# Patient Record
Sex: FEMALE | Race: OTHER | HISPANIC OR LATINO | URBAN - METROPOLITAN AREA
[De-identification: names, ages, dates, MRNs, and addresses within clinical notes are randomized per-mention and may not be internally consistent; named-entity substitution may affect disease eponyms.]

---

## 2017-03-27 ENCOUNTER — INPATIENT (INPATIENT)
Facility: HOSPITAL | Age: 27
LOS: 0 days | Discharge: HOME | DRG: 392 | End: 2017-03-28
Attending: OBSTETRICS & GYNECOLOGY | Admitting: OBSTETRICS & GYNECOLOGY
Payer: COMMERCIAL

## 2017-03-27 VITALS
TEMPERATURE: 98 F | HEART RATE: 102 BPM | RESPIRATION RATE: 18 BRPM | WEIGHT: 141.1 LBS | DIASTOLIC BLOOD PRESSURE: 80 MMHG | SYSTOLIC BLOOD PRESSURE: 135 MMHG | OXYGEN SATURATION: 99 %

## 2017-03-27 DIAGNOSIS — O47.02 FALSE LABOR BEFORE 37 COMPLETED WEEKS OF GESTATION, SECOND TRIMESTER: ICD-10-CM

## 2017-03-27 LAB
ALBUMIN SERPL ELPH-MCNC: 3.5 G/DL — SIGNIFICANT CHANGE UP (ref 3.3–5.2)
ALP SERPL-CCNC: 104 U/L — SIGNIFICANT CHANGE UP (ref 40–120)
ALT FLD-CCNC: 17 U/L — SIGNIFICANT CHANGE UP
ANION GAP SERPL CALC-SCNC: 16 MMOL/L — SIGNIFICANT CHANGE UP (ref 5–17)
ANISOCYTOSIS BLD QL: SLIGHT — SIGNIFICANT CHANGE UP
APPEARANCE UR: CLEAR — SIGNIFICANT CHANGE UP
AST SERPL-CCNC: 24 U/L — SIGNIFICANT CHANGE UP
BACTERIA # UR AUTO: ABNORMAL
BILIRUB SERPL-MCNC: 0.2 MG/DL — LOW (ref 0.4–2)
BILIRUB UR-MCNC: ABNORMAL
BUN SERPL-MCNC: 10 MG/DL — SIGNIFICANT CHANGE UP (ref 8–20)
CALCIUM SERPL-MCNC: 8.9 MG/DL — SIGNIFICANT CHANGE UP (ref 8.6–10.2)
CHLORIDE SERPL-SCNC: 99 MMOL/L — SIGNIFICANT CHANGE UP (ref 98–107)
CO2 SERPL-SCNC: 20 MMOL/L — LOW (ref 22–29)
COLOR SPEC: YELLOW — SIGNIFICANT CHANGE UP
CREAT SERPL-MCNC: 0.44 MG/DL — LOW (ref 0.5–1.3)
DACRYOCYTES BLD QL SMEAR: SLIGHT — SIGNIFICANT CHANGE UP
DIFF PNL FLD: ABNORMAL
ELLIPTOCYTES BLD QL SMEAR: SLIGHT — SIGNIFICANT CHANGE UP
EPI CELLS # UR: SIGNIFICANT CHANGE UP
GLUCOSE SERPL-MCNC: 81 MG/DL — SIGNIFICANT CHANGE UP (ref 70–115)
GLUCOSE UR QL: NEGATIVE MG/DL — SIGNIFICANT CHANGE UP
HCT VFR BLD CALC: 31.6 % — LOW (ref 37–47)
HGB BLD-MCNC: 11.1 G/DL — LOW (ref 12–16)
KETONES UR-MCNC: ABNORMAL
LEUKOCYTE ESTERASE UR-ACNC: ABNORMAL
LYMPHOCYTES # BLD AUTO: 11 % — LOW (ref 20–55)
MACROCYTES BLD QL: SLIGHT — SIGNIFICANT CHANGE UP
MAGNESIUM SERPL-MCNC: 2 MG/DL — SIGNIFICANT CHANGE UP (ref 1.8–2.5)
MCHC RBC-ENTMCNC: 28.8 PG — SIGNIFICANT CHANGE UP (ref 27–31)
MCHC RBC-ENTMCNC: 35.1 G/DL — SIGNIFICANT CHANGE UP (ref 32–36)
MCV RBC AUTO: 81.9 FL — SIGNIFICANT CHANGE UP (ref 81–99)
MICROCYTES BLD QL: SLIGHT — SIGNIFICANT CHANGE UP
MONOCYTES NFR BLD AUTO: 8 % — SIGNIFICANT CHANGE UP (ref 3–10)
NEUTROPHILS NFR BLD AUTO: 80 % — HIGH (ref 37–73)
NITRITE UR-MCNC: NEGATIVE — SIGNIFICANT CHANGE UP
OVALOCYTES BLD QL SMEAR: SLIGHT — SIGNIFICANT CHANGE UP
PH UR: 6.5 — SIGNIFICANT CHANGE UP (ref 4.8–8)
PHOSPHATE SERPL-MCNC: 1.9 MG/DL — LOW (ref 2.4–4.7)
PLAT MORPH BLD: NORMAL — SIGNIFICANT CHANGE UP
PLATELET # BLD AUTO: 226 K/UL — SIGNIFICANT CHANGE UP (ref 150–400)
POIKILOCYTOSIS BLD QL AUTO: SLIGHT — SIGNIFICANT CHANGE UP
POLYCHROMASIA BLD QL SMEAR: SLIGHT — SIGNIFICANT CHANGE UP
POTASSIUM SERPL-MCNC: 3.1 MMOL/L — LOW (ref 3.5–5.3)
POTASSIUM SERPL-SCNC: 3.1 MMOL/L — LOW (ref 3.5–5.3)
PROT SERPL-MCNC: 7.2 G/DL — SIGNIFICANT CHANGE UP (ref 6.6–8.7)
PROT UR-MCNC: 15 MG/DL
RBC # BLD: 3.86 M/UL — LOW (ref 4.4–5.2)
RBC # FLD: 14.3 % — SIGNIFICANT CHANGE UP (ref 11–15.6)
RBC BLD AUTO: ABNORMAL
RBC CASTS # UR COMP ASSIST: ABNORMAL /HPF (ref 0–4)
SODIUM SERPL-SCNC: 135 MMOL/L — SIGNIFICANT CHANGE UP (ref 135–145)
SP GR SPEC: 1.01 — SIGNIFICANT CHANGE UP (ref 1.01–1.02)
UROBILINOGEN FLD QL: NEGATIVE MG/DL — SIGNIFICANT CHANGE UP
VARIANT LYMPHS # BLD: 1 % — SIGNIFICANT CHANGE UP (ref 0–6)
WBC # BLD: 4 K/UL — LOW (ref 4.8–10.8)
WBC # FLD AUTO: 4 K/UL — LOW (ref 4.8–10.8)
WBC UR QL: ABNORMAL

## 2017-03-27 RX ORDER — SODIUM CHLORIDE 9 MG/ML
1000 INJECTION, SOLUTION INTRAVENOUS ONCE
Qty: 0 | Refills: 0 | Status: COMPLETED | OUTPATIENT
Start: 2017-03-27 | End: 2017-03-27

## 2017-03-27 RX ORDER — SODIUM CHLORIDE 9 MG/ML
1000 INJECTION, SOLUTION INTRAVENOUS
Qty: 0 | Refills: 0 | Status: DISCONTINUED | OUTPATIENT
Start: 2017-03-27 | End: 2017-03-27

## 2017-03-27 RX ORDER — POTASSIUM PHOSPHATE, MONOBASIC POTASSIUM PHOSPHATE, DIBASIC 236; 224 MG/ML; MG/ML
20 INJECTION, SOLUTION INTRAVENOUS ONCE
Qty: 0 | Refills: 0 | Status: DISCONTINUED | OUTPATIENT
Start: 2017-03-27 | End: 2017-03-27

## 2017-03-27 RX ORDER — POTASSIUM CHLORIDE 20 MEQ
10 PACKET (EA) ORAL
Qty: 0 | Refills: 0 | Status: DISCONTINUED | OUTPATIENT
Start: 2017-03-27 | End: 2017-03-27

## 2017-03-27 RX ORDER — SODIUM,POTASSIUM PHOSPHATES 278-250MG
1 POWDER IN PACKET (EA) ORAL
Qty: 0 | Refills: 0 | Status: DISCONTINUED | OUTPATIENT
Start: 2017-03-27 | End: 2017-03-28

## 2017-03-27 RX ORDER — SODIUM,POTASSIUM PHOSPHATES 278-250MG
1 POWDER IN PACKET (EA) ORAL
Qty: 0 | Refills: 0 | Status: DISCONTINUED | OUTPATIENT
Start: 2017-03-27 | End: 2017-03-27

## 2017-03-27 RX ORDER — SODIUM CHLORIDE 9 MG/ML
1000 INJECTION, SOLUTION INTRAVENOUS
Qty: 0 | Refills: 0 | Status: DISCONTINUED | OUTPATIENT
Start: 2017-03-27 | End: 2017-03-28

## 2017-03-27 RX ORDER — POTASSIUM PHOSPHATE, MONOBASIC POTASSIUM PHOSPHATE, DIBASIC 236; 224 MG/ML; MG/ML
15 INJECTION, SOLUTION INTRAVENOUS ONCE
Qty: 0 | Refills: 0 | Status: DISCONTINUED | OUTPATIENT
Start: 2017-03-27 | End: 2017-03-27

## 2017-03-27 RX ORDER — ONDANSETRON 8 MG/1
4 TABLET, FILM COATED ORAL EVERY 6 HOURS
Qty: 0 | Refills: 0 | Status: DISCONTINUED | OUTPATIENT
Start: 2017-03-27 | End: 2017-03-28

## 2017-03-27 RX ORDER — POTASSIUM CHLORIDE 20 MEQ
20 PACKET (EA) ORAL
Qty: 0 | Refills: 0 | Status: DISCONTINUED | OUTPATIENT
Start: 2017-03-27 | End: 2017-03-27

## 2017-03-27 RX ADMIN — SODIUM CHLORIDE 1000 MILLILITER(S): 9 INJECTION, SOLUTION INTRAVENOUS at 16:52

## 2017-03-27 RX ADMIN — SODIUM CHLORIDE 125 MILLILITER(S): 9 INJECTION, SOLUTION INTRAVENOUS at 21:41

## 2017-03-27 RX ADMIN — Medication 100 MILLIEQUIVALENT(S): at 19:19

## 2017-03-27 RX ADMIN — SODIUM CHLORIDE 125 MILLILITER(S): 9 INJECTION, SOLUTION INTRAVENOUS at 17:53

## 2017-03-27 RX ADMIN — Medication 1 TABLET(S): at 21:43

## 2017-03-28 VITALS
RESPIRATION RATE: 189 BRPM | DIASTOLIC BLOOD PRESSURE: 56 MMHG | TEMPERATURE: 98 F | HEART RATE: 97 BPM | SYSTOLIC BLOOD PRESSURE: 90 MMHG

## 2017-03-28 DIAGNOSIS — K52.9 NONINFECTIVE GASTROENTERITIS AND COLITIS, UNSPECIFIED: ICD-10-CM

## 2017-03-28 LAB
ALBUMIN SERPL ELPH-MCNC: 3 G/DL — LOW (ref 3.3–5.2)
ALP SERPL-CCNC: 82 U/L — SIGNIFICANT CHANGE UP (ref 40–120)
ALT FLD-CCNC: 13 U/L — SIGNIFICANT CHANGE UP
ANION GAP SERPL CALC-SCNC: 12 MMOL/L — SIGNIFICANT CHANGE UP (ref 5–17)
AST SERPL-CCNC: 20 U/L — SIGNIFICANT CHANGE UP
BILIRUB SERPL-MCNC: 0.2 MG/DL — LOW (ref 0.4–2)
BUN SERPL-MCNC: 6 MG/DL — LOW (ref 8–20)
CALCIUM SERPL-MCNC: 7.8 MG/DL — LOW (ref 8.6–10.2)
CHLORIDE SERPL-SCNC: 104 MMOL/L — SIGNIFICANT CHANGE UP (ref 98–107)
CO2 SERPL-SCNC: 22 MMOL/L — SIGNIFICANT CHANGE UP (ref 22–29)
CREAT SERPL-MCNC: 0.35 MG/DL — LOW (ref 0.5–1.3)
GLUCOSE SERPL-MCNC: 70 MG/DL — SIGNIFICANT CHANGE UP (ref 70–115)
POTASSIUM SERPL-MCNC: 3.3 MMOL/L — LOW (ref 3.5–5.3)
POTASSIUM SERPL-SCNC: 3.3 MMOL/L — LOW (ref 3.5–5.3)
PROT SERPL-MCNC: 5.7 G/DL — LOW (ref 6.6–8.7)
SODIUM SERPL-SCNC: 138 MMOL/L — SIGNIFICANT CHANGE UP (ref 135–145)

## 2017-03-28 RX ORDER — ONDANSETRON 8 MG/1
1 TABLET, FILM COATED ORAL
Qty: 28 | Refills: 0 | OUTPATIENT
Start: 2017-03-28 | End: 2017-04-04

## 2017-03-28 RX ADMIN — SODIUM CHLORIDE 125 MILLILITER(S): 9 INJECTION, SOLUTION INTRAVENOUS at 06:19

## 2017-03-28 RX ADMIN — Medication 1 TABLET(S): at 13:38

## 2017-03-28 RX ADMIN — Medication 1 TABLET(S): at 11:21

## 2017-03-28 NOTE — PROGRESS NOTE ADULT - SUBJECTIVE AND OBJECTIVE BOX
26y year old  s/p  at 25.5wk gestation here for cc viral gastroenteritis.   Patient seen and examined at bedside, no acute overnight events.   Patient is ambulating, +eating, +PO hydration, +voiding, +Flatus.  Denies any abdominal pain. afebrile overnight;   Denies headache, SOB, fever, chills and calf pain.    VS:   Vital Signs Last 24 Hrs  T(C): 36.8, Max: 36.8 ( @ 01:57)  T(F): 98.2, Max: 98.2 ( @ 01:57)  afebrile overnight  HR: 104 (102 - 104)   tachycardic  BP: 121/80 (121/80 - 135/80)  BP(mean): --  RR: 19 (18 - 19)  SpO2: 98% (98% - 99%)    Physical Exam:  General: NAD  CVS: RRR, +S1/S2  Lungs: CTAB, no wheeze, ronchi or rales.   Abdomen: +BS, soft, ND, minimally tender,  Ext: No cyanosis, edema or calf tenderness.     Labs:                        11.1   4.0   )-----------( 226      ( 27 Mar 2017 17:23 )             31.6     Urinalysis Basic - ( 27 Mar 2017 17:21 )    Color: Yellow / Appearance: Clear / S.010 / pH: x  Gluc: x / Ketone: Moderate  / Bili: Small / Urobili: Negative mg/dL   Blood: x / Protein: 15 mg/dL / Nitrite: Negative   Leuk Esterase: Small / RBC: 3-5 /HPF / WBC 6-10   Sq Epi: x / Non Sq Epi: Few / Bacteria: Few        Medication:  MEDICATIONS  (STANDING):  lactated ringers 1000milliLiter(s) IV Continuous <Continuous>  potassium acid phosphate/sodium acid phosphate tablet (K-PHOS No. 2) 1Tablet(s) Oral four times a day with meals    MEDICATIONS  (PRN):  ondansetron    Tablet 4milliGRAM(s) Oral every 6 hours PRN Nausea and/or Vomiting 26y year old  s/p  at 25.5wk gestation here for cc viral gastroenteritis.   Patient seen and examined at bedside, no acute overnight events.   Patient is ambulating, +PO hydration, +voiding, +Flatus.  Denies any abdominal pain. afebrile, no bm, no vomiting overnight;   Patient tolerated yogurt and jello last night;    Denies vomiting,headache, SOB, fever, chills and calf pain.  Last BM was 7am yesterday;     VS:   Vital Signs Last 24 Hrs  T(C): 36.8, Max: 36.8 ( @ 01:57)  T(F): 98.2, Max: 98.2 ( @ 01:57)  afebrile overnight  HR: 104 (102 - 104)   tachycardic  BP: 121/80 (121/80 - 135/80)  BP(mean): --  RR: 19 (18 - 19)  SpO2: 98% (98% - 99%)    Physical Exam:  General: NAD  CVS: RRR, +S1/S2  Lungs: CTAB, no wheeze, ronchi or rales.   Abdomen: +BS, soft, ND, minimally tender,  Ext: No cyanosis, edema or calf tenderness.     Labs:                        11.1   4.0   )-----------( 226      ( 27 Mar 2017 17:23 )             31.6     Urinalysis Basic - ( 27 Mar 2017 17:21 )    Color: Yellow / Appearance: Clear / S.010 / pH: x  Gluc: x / Ketone: Moderate  / Bili: Small / Urobili: Negative mg/dL   Blood: x / Protein: 15 mg/dL / Nitrite: Negative   Leuk Esterase: Small / RBC: 3-5 /HPF / WBC 6-10   Sq Epi: x / Non Sq Epi: Few / Bacteria: Few        Medication:  MEDICATIONS  (STANDING):  lactated ringers 1000milliLiter(s) IV Continuous <Continuous>  potassium acid phosphate/sodium acid phosphate tablet (K-PHOS No. 2) 1Tablet(s) Oral four times a day with meals    MEDICATIONS  (PRN):  ondansetron    Tablet 4milliGRAM(s) Oral every 6 hours PRN Nausea and/or Vomiting 26y year old  s/p  at 25.5wk gestation here for cc viral gastroenteritis.   Patient seen and examined at bedside, no acute overnight events.   Patient is ambulating, +PO hydration, +voiding, +Flatus.  Denies any abdominal pain. afebrile, no bm, no vomiting overnight;   Patient tolerated yogurt and jello last night;    Denies vomiting, headache, SOB, fever, chills and calf pain.  Last BM was 7am yesterday;     PMH- neg    PSH- neg;    Allergies- Penicillin (hives)    VS:   Vital Signs Last 24 Hrs  T(C): 36.8, Max: 36.8 ( @ 01:57)  T(F): 98.2, Max: 98.2 ( @ 01:57)  afebrile overnight  HR: 104 (102 - 104)   tachycardic  BP: 121/80 (121/80 - 135/80)  BP(mean): --  RR: 19 (18 - 19)  SpO2: 98% (98% - 99%)    Physical Exam:  General: NAD  CVS: RRR, +S1/S2  Lungs: CTAB, no wheeze, ronchi or rales.   Abdomen: +BS, soft, ND, minimally tender,  Ext: No cyanosis, edema or calf tenderness.     Labs:                        11.1   4.0   )-----------( 226      ( 27 Mar 2017 17:23 )             31.6     Urinalysis Basic - ( 27 Mar 2017 17:21 )    Color: Yellow / Appearance: Clear / S.010 / pH: x  Gluc: x / Ketone: Moderate  / Bili: Small / Urobili: Negative mg/dL   Blood: x / Protein: 15 mg/dL / Nitrite: Negative   Leuk Esterase: Small / RBC: 3-5 /HPF / WBC 6-10   Sq Epi: x / Non Sq Epi: Few / Bacteria: Few        Medication:  MEDICATIONS  (STANDING):  lactated ringers 1000milliLiter(s) IV Continuous <Continuous>  potassium acid phosphate/sodium acid phosphate tablet (K-PHOS No. 2) 1Tablet(s) Oral four times a day with meals    MEDICATIONS  (PRN):  ondansetron    Tablet 4milliGRAM(s) Oral every 6 hours PRN Nausea and/or Vomiting

## 2017-03-28 NOTE — DISCHARGE NOTE ANTEPARTUM - HOSPITAL COURSE
26y year old  s/p  at 25.5wk gestation here for cc viral [id number] year old  s/p  at 25.5wk gestation here for cc viral gastroenteritis.   patient stable, afebrile;   s/p potassium repletion;  pending f/u labs this am;  diet will be advanced to low residue diet (no fatty foods). Pt is 26y year old  s/p  at 25.5wk gestation here 2with diarrhea and vomiting for 3 days. Pt is diagnosed with viral gastroenteritis and kept for observation. Pt was found to have electrolyte imbalance and electrolytes were replaced. Pt given IV fluids. Patient is stable, afebrile. Diet is advanced to low residue diet (no fatty foods). Pt had only one episode of diarrhea while in hospital. Pt feels better. Pt was also started on zofran for vomiting. Pt is medically optimized for discharge to f/u at [facility].

## 2017-03-28 NOTE — PROGRESS NOTE ADULT - PROBLEM SELECTOR PLAN 1
viral gastroenteritis; patient stable, afebrile;   s/p potassium repletion;  pending f/u labs this am; viral gastroenteritis; patient stable, afebrile;   s/p potassium repletion;  pending f/u labs this am;  diet will be advanced to low residue diet (no fatty foods)

## 2017-03-28 NOTE — DISCHARGE NOTE ANTEPARTUM - CARE PLAN
Principal Discharge DX:	Gastroenteritis  Goal:	resolving;  Instructions for follow-up, activity and diet:	followup with obgyn within the next week;  no strenuous activity;  bland diet for the next week; Principal Discharge DX:	Gastroenteritis  Goal:	resolving;  Instructions for follow-up, activity and diet:	No strenuous activity recommended. Diet and activity as tolerated. Small food portions advised at a time. Pt advised to followup with ob/gyn within the next week.

## 2017-03-28 NOTE — DISCHARGE NOTE ANTEPARTUM - CARE PROVIDER_API CALL
Willi Orellana (MD), Obstetrics and Gynecology  [address] [place] [address]  Phone: [phone]  Fax: [phone]

## 2017-03-28 NOTE — DISCHARGE NOTE ANTEPARTUM - PATIENT PORTAL LINK FT
“You can access the FollowHealth Patient Portal, offered by [facility], by registering with the following website: [email or web address]”

## 2017-03-28 NOTE — DISCHARGE NOTE ANTEPARTUM - PLAN OF CARE
resolving; followup with obgyn within the next week;  no strenuous activity;  bland diet for the next week; No strenuous activity recommended. Diet and activity as tolerated. Small food portions advised at a time. Pt advised to followup with ob/gyn within the next week.

## 2017-03-28 NOTE — DISCHARGE NOTE ANTEPARTUM - MEDICATION SUMMARY - MEDICATIONS TO TAKE
I will START or STAY ON the medications listed below when I get home from the hospital:    prenatal vitamins  --  by mouth once a day  -- Indication: For vitamin    ondansetron 4 mg oral tablet  -- 1 tab(s) by mouth every 6 hours, As needed, Nausea and/or Vomiting  -- Indication: For vomiting

## 2017-07-02 ENCOUNTER — OUTPATIENT (OUTPATIENT)
Dept: OUTPATIENT SERVICES | Facility: HOSPITAL | Age: 27
LOS: 1 days | End: 2017-07-02
Payer: COMMERCIAL

## 2017-07-02 DIAGNOSIS — O47.1 FALSE LABOR AT OR AFTER 37 COMPLETED WEEKS OF GESTATION: ICD-10-CM

## 2017-07-02 DIAGNOSIS — O35.9XX0 MATERNAL CARE FOR (SUSPECTED) FETAL ABNORMALITY AND DAMAGE, UNSPECIFIED, NOT APPLICABLE OR UNSPECIFIED: ICD-10-CM

## 2017-07-02 PROCEDURE — G0463: CPT

## 2017-07-02 PROCEDURE — 59025 FETAL NON-STRESS TEST: CPT

## 2017-07-02 PROCEDURE — 76815 OB US LIMITED FETUS(S): CPT

## 2017-07-03 ENCOUNTER — INPATIENT (INPATIENT)
Facility: HOSPITAL | Age: 27
LOS: 1 days | Discharge: HOME | End: 2017-07-05
Attending: OBSTETRICS & GYNECOLOGY | Admitting: OBSTETRICS & GYNECOLOGY
Payer: COMMERCIAL

## 2017-07-03 VITALS — WEIGHT: 147.71 LBS | HEIGHT: 60 IN

## 2017-07-03 DIAGNOSIS — O26.893 OTHER SPECIFIED PREGNANCY RELATED CONDITIONS, THIRD TRIMESTER: ICD-10-CM

## 2017-07-03 DIAGNOSIS — O47.1 FALSE LABOR AT OR AFTER 37 COMPLETED WEEKS OF GESTATION: ICD-10-CM

## 2017-07-03 LAB
APPEARANCE UR: CLEAR — SIGNIFICANT CHANGE UP
BACTERIA # UR AUTO: ABNORMAL
BASE EXCESS BLDCOA CALC-SCNC: -7.4 MMOL/L — SIGNIFICANT CHANGE UP (ref -11.6–0.4)
BASOPHILS # BLD AUTO: 0 K/UL — SIGNIFICANT CHANGE UP (ref 0–0.2)
BASOPHILS NFR BLD AUTO: 0.1 % — SIGNIFICANT CHANGE UP (ref 0–2)
BILIRUB UR-MCNC: ABNORMAL
BLD GP AB SCN SERPL QL: SIGNIFICANT CHANGE UP
COLOR SPEC: YELLOW — SIGNIFICANT CHANGE UP
DIFF PNL FLD: ABNORMAL
EPI CELLS # UR: SIGNIFICANT CHANGE UP
GAS PNL BLDCOV: 7.36 — SIGNIFICANT CHANGE UP (ref 7.11–7.36)
GLUCOSE UR QL: NEGATIVE MG/DL — SIGNIFICANT CHANGE UP
HBV SURFACE AG SERPL QL IA: SIGNIFICANT CHANGE UP
HCO3 BLDCOA-SCNC: 17 MMOL/L — SIGNIFICANT CHANGE UP (ref 15–27)
HCT VFR BLD CALC: 32.5 % — LOW (ref 37–47)
HGB BLD-MCNC: 10.7 G/DL — LOW (ref 12–16)
KETONES UR-MCNC: ABNORMAL
LEUKOCYTE ESTERASE UR-ACNC: ABNORMAL
LYMPHOCYTES # BLD AUTO: 0.8 K/UL — LOW (ref 1–4.8)
LYMPHOCYTES # BLD AUTO: 12.6 % — LOW (ref 20–55)
MCHC RBC-ENTMCNC: 25.1 PG — LOW (ref 27–31)
MCHC RBC-ENTMCNC: 32.9 G/DL — SIGNIFICANT CHANGE UP (ref 32–36)
MCV RBC AUTO: 76.3 FL — LOW (ref 81–99)
MONOCYTES # BLD AUTO: 0.3 K/UL — SIGNIFICANT CHANGE UP (ref 0–0.8)
MONOCYTES NFR BLD AUTO: 4.4 % — SIGNIFICANT CHANGE UP (ref 3–10)
NEUTROPHILS # BLD AUTO: 5.6 K/UL — SIGNIFICANT CHANGE UP (ref 1.8–8)
NEUTROPHILS NFR BLD AUTO: 82.3 % — HIGH (ref 37–73)
NITRITE UR-MCNC: NEGATIVE — SIGNIFICANT CHANGE UP
PCO2 BLDCOA: 34.7 MMHG — SIGNIFICANT CHANGE UP (ref 32.2–65.8)
PCO2 BLDCOV: 35.8 MMHG — SIGNIFICANT CHANGE UP (ref 27–49.4)
PH BLDCOA: 7.32 — SIGNIFICANT CHANGE UP (ref 7.11–7.36)
PH UR: 6 — SIGNIFICANT CHANGE UP (ref 5–8)
PLATELET # BLD AUTO: 188 K/UL — SIGNIFICANT CHANGE UP (ref 150–400)
PO2 BLDCOA: 36 MMHG — HIGH (ref 6–30)
PO2 BLDCOA: 36.6 MMHG — SIGNIFICANT CHANGE UP (ref 17.4–41)
PROT UR-MCNC: 30 MG/DL
RBC # BLD: 4.26 M/UL — LOW (ref 4.4–5.2)
RBC # FLD: 16 % — HIGH (ref 11–15.6)
RBC CASTS # UR COMP ASSIST: ABNORMAL /HPF (ref 0–4)
RPR SERPL-ACNC: SIGNIFICANT CHANGE UP
RUBV IGG SER-ACNC: POSITIVE — SIGNIFICANT CHANGE UP
RUBV IGG SER-IMP: SIGNIFICANT CHANGE UP
SAO2 % BLDCOA: SIGNIFICANT CHANGE UP
SAO2 % BLDCOV: SIGNIFICANT CHANGE UP
SP GR SPEC: 1.02 — SIGNIFICANT CHANGE UP (ref 1.01–1.02)
TYPE + AB SCN PNL BLD: SIGNIFICANT CHANGE UP
UROBILINOGEN FLD QL: NEGATIVE MG/DL — SIGNIFICANT CHANGE UP
WBC # BLD: 6.8 K/UL — SIGNIFICANT CHANGE UP (ref 4.8–10.8)
WBC # FLD AUTO: 6.8 K/UL — SIGNIFICANT CHANGE UP (ref 4.8–10.8)
WBC UR QL: ABNORMAL

## 2017-07-03 PROCEDURE — 93010 ELECTROCARDIOGRAM REPORT: CPT

## 2017-07-03 RX ORDER — DIBUCAINE 1 %
1 OINTMENT (GRAM) RECTAL EVERY 4 HOURS
Qty: 0 | Refills: 0 | Status: DISCONTINUED | OUTPATIENT
Start: 2017-07-03 | End: 2017-07-05

## 2017-07-03 RX ORDER — OXYTOCIN 10 UNIT/ML
333.33 VIAL (ML) INJECTION
Qty: 20 | Refills: 0 | Status: DISCONTINUED | OUTPATIENT
Start: 2017-07-03 | End: 2017-07-03

## 2017-07-03 RX ORDER — SODIUM CHLORIDE 9 MG/ML
1000 INJECTION, SOLUTION INTRAVENOUS
Qty: 0 | Refills: 0 | Status: DISCONTINUED | OUTPATIENT
Start: 2017-07-03 | End: 2017-07-03

## 2017-07-03 RX ORDER — TETANUS TOXOID, REDUCED DIPHTHERIA TOXOID AND ACELLULAR PERTUSSIS VACCINE, ADSORBED 5; 2.5; 8; 8; 2.5 [IU]/.5ML; [IU]/.5ML; UG/.5ML; UG/.5ML; UG/.5ML
0.5 SUSPENSION INTRAMUSCULAR ONCE
Qty: 0 | Refills: 0 | Status: DISCONTINUED | OUTPATIENT
Start: 2017-07-03 | End: 2017-07-05

## 2017-07-03 RX ORDER — MAGNESIUM HYDROXIDE 400 MG/1
30 TABLET, CHEWABLE ORAL
Qty: 0 | Refills: 0 | Status: DISCONTINUED | OUTPATIENT
Start: 2017-07-03 | End: 2017-07-05

## 2017-07-03 RX ORDER — OXYTOCIN 10 UNIT/ML
333.33 VIAL (ML) INJECTION
Qty: 20 | Refills: 0 | Status: COMPLETED | OUTPATIENT
Start: 2017-07-03

## 2017-07-03 RX ORDER — IBUPROFEN 200 MG
600 TABLET ORAL EVERY 6 HOURS
Qty: 0 | Refills: 0 | Status: DISCONTINUED | OUTPATIENT
Start: 2017-07-03 | End: 2017-07-05

## 2017-07-03 RX ORDER — GLYCERIN ADULT
1 SUPPOSITORY, RECTAL RECTAL AT BEDTIME
Qty: 0 | Refills: 0 | Status: DISCONTINUED | OUTPATIENT
Start: 2017-07-03 | End: 2017-07-05

## 2017-07-03 RX ORDER — FERROUS SULFATE 325(65) MG
325 TABLET ORAL DAILY
Qty: 0 | Refills: 0 | Status: DISCONTINUED | OUTPATIENT
Start: 2017-07-03 | End: 2017-07-05

## 2017-07-03 RX ORDER — SODIUM CHLORIDE 9 MG/ML
1000 INJECTION, SOLUTION INTRAVENOUS ONCE
Qty: 0 | Refills: 0 | Status: DISCONTINUED | OUTPATIENT
Start: 2017-07-03 | End: 2017-07-03

## 2017-07-03 RX ORDER — DOCUSATE SODIUM 100 MG
100 CAPSULE ORAL
Qty: 0 | Refills: 0 | Status: DISCONTINUED | OUTPATIENT
Start: 2017-07-03 | End: 2017-07-05

## 2017-07-03 RX ORDER — CITRIC ACID/SODIUM CITRATE 300-500 MG
30 SOLUTION, ORAL ORAL ONCE
Qty: 0 | Refills: 0 | Status: DISCONTINUED | OUTPATIENT
Start: 2017-07-03 | End: 2017-07-03

## 2017-07-03 RX ORDER — ACETAMINOPHEN 500 MG
650 TABLET ORAL EVERY 6 HOURS
Qty: 0 | Refills: 0 | Status: DISCONTINUED | OUTPATIENT
Start: 2017-07-03 | End: 2017-07-05

## 2017-07-03 RX ORDER — OXYTOCIN 10 UNIT/ML
41.67 VIAL (ML) INJECTION
Qty: 20 | Refills: 0 | Status: DISCONTINUED | OUTPATIENT
Start: 2017-07-03 | End: 2017-07-05

## 2017-07-03 RX ORDER — SODIUM CHLORIDE 9 MG/ML
3 INJECTION INTRAMUSCULAR; INTRAVENOUS; SUBCUTANEOUS EVERY 8 HOURS
Qty: 0 | Refills: 0 | Status: DISCONTINUED | OUTPATIENT
Start: 2017-07-03 | End: 2017-07-05

## 2017-07-03 RX ORDER — AER TRAVELER 0.5 G/1
1 SOLUTION RECTAL; TOPICAL EVERY 4 HOURS
Qty: 0 | Refills: 0 | Status: DISCONTINUED | OUTPATIENT
Start: 2017-07-03 | End: 2017-07-05

## 2017-07-03 RX ORDER — LANOLIN
1 OINTMENT (GRAM) TOPICAL EVERY 6 HOURS
Qty: 0 | Refills: 0 | Status: DISCONTINUED | OUTPATIENT
Start: 2017-07-03 | End: 2017-07-05

## 2017-07-03 RX ORDER — SIMETHICONE 80 MG/1
80 TABLET, CHEWABLE ORAL EVERY 6 HOURS
Qty: 0 | Refills: 0 | Status: DISCONTINUED | OUTPATIENT
Start: 2017-07-03 | End: 2017-07-05

## 2017-07-03 RX ORDER — HYDROCORTISONE 1 %
1 OINTMENT (GRAM) TOPICAL EVERY 4 HOURS
Qty: 0 | Refills: 0 | Status: DISCONTINUED | OUTPATIENT
Start: 2017-07-03 | End: 2017-07-05

## 2017-07-03 RX ORDER — DIPHENHYDRAMINE HCL 50 MG
25 CAPSULE ORAL EVERY 6 HOURS
Qty: 0 | Refills: 0 | Status: DISCONTINUED | OUTPATIENT
Start: 2017-07-03 | End: 2017-07-05

## 2017-07-03 RX ADMIN — SODIUM CHLORIDE 3 MILLILITER(S): 9 INJECTION INTRAMUSCULAR; INTRAVENOUS; SUBCUTANEOUS at 15:30

## 2017-07-03 NOTE — CHART NOTE - NSCHARTNOTEFT_GEN_A_CORE
Pt is 28 y/o F now s/p  PPD #0. I was called by nurse because pt c/o pressure-like chest pain, 2/10 in intensity, no irradiated, associated with mild dizziness that resolved spontaneously. Pt was seen and examined at bedside. this morning and 2 last night. Pt denies any headache, nausea/vomiting, shortness of breath, edema or blurred vision. She is tolerating PO intake, has mild abdominal pain, reports adequate urine output.    Latest Vital Signs  T(C): 36.8 (2017 13:21)  HR: 68 (2017 13:21)  BP: 110/71 (2017 13:21)  RR: 16 (2017 13:21)    Physical Exam:  General: NAD, patient seems comfortable.  CVS: RRR, +S1/S2  Lungs: CTAB, no wheeze, rhonchi or rales.   Abdomen: +BS, Mild tenderness on palpation, non distended. Fundus of uterus at umbilicus level.  Pelvic: Moderate lochia.  Ext: No edema    Labs:                        10.7   6.8   )-----------( 188      ( 2017 08:13 )             32.5              U/A  Urinalysis Basic - ( 2017 08:11 )    Color: Yellow / Appearance: Clear / S.020 / pH: x  Gluc: x / Ketone: Moderate  / Bili: Small / Urobili: Negative mg/dL   Blood: x / Protein: 30 mg/dL / Nitrite: Negative   Leuk Esterase: Small / RBC: 3-5 /HPF / WBC 6-10   Sq Epi: x / Non Sq Epi: Few / Bacteria: x    MEDICATIONS  (STANDING):  sodium chloride 0.9% lock flush 3 milliLiter(s) IV Push every 8 hours  diphtheria/tetanus/pertussis (acellular) Vaccine (ADAcel) 0.5 milliLiter(s) IntraMuscular once  oxytocin Infusion 41.667 milliUNIT(s)/Min (125 mL/Hr) IV Continuous <Continuous>  prenatal multivitamin 1 Tablet(s) Oral daily  ferrous    sulfate 325 milliGRAM(s) Oral daily    MEDICATIONS  (PRN):  acetaminophen   Tablet. 650 milliGRAM(s) Oral every 6 hours PRN Mild Pain  acetaminophen   Tablet 650 milliGRAM(s) Oral every 6 hours PRN For Temp greater than 38.5 C (101.3 F)  ibuprofen  Tablet 600 milliGRAM(s) Oral every 6 hours PRN Moderate Pain  oxyCODONE  5 mG/acetaminophen 325 mG 2 Tablet(s) Oral every 6 hours PRN Severe Pain  hydrocortisone 1% Cream 1 Application(s) Topical every 4 hours PRN Moderate to Severe Perineal Pain  dibucaine 1% Ointment 1 Application(s) Topical every 4 hours PRN Perineal Discomfort  lanolin Ointment 1 Application(s) Topical every 6 hours PRN Sore Nipples  witch hazel Pads 1 Application(s) Topical every 4 hours PRN Perineal Discomfort  simethicone 80 milliGRAM(s) Chew every 6 hours PRN Gas  diphenhydrAMINE   Capsule 25 milliGRAM(s) Oral every 6 hours PRN Itching  glycerin Suppository - Adult 1 Suppository(s) Rectal at bedtime PRN Constipation  magnesium hydroxide Suspension 30 milliLiter(s) Oral two times a day PRN Constipation  docusate sodium 100 milliGRAM(s) Oral two times a day PRN Stool Softening    Assessment and Plan  1. Status post Normal Spontaneous vaginal delivery  - Pt stable, symptoms resolved.  - Continue routine monitoring Pt is 28 y/o F now s/p  PPD #0. I was called by nurse because pt c/o pressure-like chest pain, 2/10 in intensity, no irradiated, associated with mild dizziness that resolved spontaneously. Pt was seen and examined at bedside. Pt denies any headache, nausea/vomiting, shortness of breath, edema or blurred vision. She is tolerating PO intake, has mild abdominal pain, reports adequate urine output.    Latest Vital Signs  T(C): 36.8 (2017 13:21)  HR: 68 (2017 13:21)  BP: 110/71 (2017 13:21)  RR: 16 (2017 13:21)    Physical Exam:  General: NAD, patient seems comfortable.  CVS: RRR, +S1/S2  Lungs: CTAB, no wheeze, rhonchi or rales.   Abdomen: +BS, Mild tenderness on palpation, non distended. Fundus of uterus at umbilicus level.  Pelvic: Moderate lochia.  Ext: No edema, no calf tenderness    Labs:                        10.7   6.8   )-----------( 188      ( 2017 08:13 )             32.5              U/A  Urinalysis Basic - ( 2017 08:11 )    Color: Yellow / Appearance: Clear / S.020 / pH: x  Gluc: x / Ketone: Moderate  / Bili: Small / Urobili: Negative mg/dL   Blood: x / Protein: 30 mg/dL / Nitrite: Negative   Leuk Esterase: Small / RBC: 3-5 /HPF / WBC 6-10   Sq Epi: x / Non Sq Epi: Few / Bacteria: x    MEDICATIONS  (STANDING):  sodium chloride 0.9% lock flush 3 milliLiter(s) IV Push every 8 hours  diphtheria/tetanus/pertussis (acellular) Vaccine (ADAcel) 0.5 milliLiter(s) IntraMuscular once  oxytocin Infusion 41.667 milliUNIT(s)/Min (125 mL/Hr) IV Continuous <Continuous>  prenatal multivitamin 1 Tablet(s) Oral daily  ferrous    sulfate 325 milliGRAM(s) Oral daily    MEDICATIONS  (PRN):  acetaminophen   Tablet. 650 milliGRAM(s) Oral every 6 hours PRN Mild Pain  acetaminophen   Tablet 650 milliGRAM(s) Oral every 6 hours PRN For Temp greater than 38.5 C (101.3 F)  ibuprofen  Tablet 600 milliGRAM(s) Oral every 6 hours PRN Moderate Pain  oxyCODONE  5 mG/acetaminophen 325 mG 2 Tablet(s) Oral every 6 hours PRN Severe Pain  hydrocortisone 1% Cream 1 Application(s) Topical every 4 hours PRN Moderate to Severe Perineal Pain  dibucaine 1% Ointment 1 Application(s) Topical every 4 hours PRN Perineal Discomfort  lanolin Ointment 1 Application(s) Topical every 6 hours PRN Sore Nipples  witch hazel Pads 1 Application(s) Topical every 4 hours PRN Perineal Discomfort  simethicone 80 milliGRAM(s) Chew every 6 hours PRN Gas  diphenhydrAMINE   Capsule 25 milliGRAM(s) Oral every 6 hours PRN Itching  glycerin Suppository - Adult 1 Suppository(s) Rectal at bedtime PRN Constipation  magnesium hydroxide Suspension 30 milliLiter(s) Oral two times a day PRN Constipation  docusate sodium 100 milliGRAM(s) Oral two times a day PRN Stool Softening    Assessment and Plan  1. Status post Normal Spontaneous vaginal delivery.  - Pt stable. Vital signs are stable. Symptoms now resolved.  -Will get EKG STAT and follow up with further recommendation.  - Continue routine monitoring  -Plan discussed with Dr. Ibarra.

## 2017-07-04 LAB
BASOPHILS # BLD AUTO: 0 K/UL — SIGNIFICANT CHANGE UP (ref 0–0.2)
BASOPHILS NFR BLD AUTO: 0.2 % — SIGNIFICANT CHANGE UP (ref 0–2)
EOSINOPHIL # BLD AUTO: 0 K/UL — SIGNIFICANT CHANGE UP (ref 0–0.5)
EOSINOPHIL NFR BLD AUTO: 0.2 % — SIGNIFICANT CHANGE UP (ref 0–6)
HCT VFR BLD CALC: 28 % — LOW (ref 37–47)
HGB BLD-MCNC: 8.9 G/DL — LOW (ref 12–16)
LYMPHOCYTES # BLD AUTO: 1.8 K/UL — SIGNIFICANT CHANGE UP (ref 1–4.8)
LYMPHOCYTES # BLD AUTO: 30 % — SIGNIFICANT CHANGE UP (ref 20–55)
MCHC RBC-ENTMCNC: 24.8 PG — LOW (ref 27–31)
MCHC RBC-ENTMCNC: 31.8 G/DL — LOW (ref 32–36)
MCV RBC AUTO: 78 FL — LOW (ref 81–99)
MEV IGM SER-ACNC: <20 AU/ML — SIGNIFICANT CHANGE UP (ref 0–19.9)
MONOCYTES # BLD AUTO: 0.4 K/UL — SIGNIFICANT CHANGE UP (ref 0–0.8)
MONOCYTES NFR BLD AUTO: 7 % — SIGNIFICANT CHANGE UP (ref 3–10)
NEUTROPHILS # BLD AUTO: 3.7 K/UL — SIGNIFICANT CHANGE UP (ref 1.8–8)
NEUTROPHILS NFR BLD AUTO: 62.3 % — SIGNIFICANT CHANGE UP (ref 37–73)
PLATELET # BLD AUTO: 187 K/UL — SIGNIFICANT CHANGE UP (ref 150–400)
RBC # BLD: 3.59 M/UL — LOW (ref 4.4–5.2)
RBC # FLD: 16.4 % — HIGH (ref 11–15.6)
WBC # BLD: 6 K/UL — SIGNIFICANT CHANGE UP (ref 4.8–10.8)
WBC # FLD AUTO: 6 K/UL — SIGNIFICANT CHANGE UP (ref 4.8–10.8)

## 2017-07-04 RX ORDER — IBUPROFEN 200 MG
1 TABLET ORAL
Qty: 120 | Refills: 0 | OUTPATIENT
Start: 2017-07-04 | End: 2017-08-03

## 2017-07-04 RX ORDER — FERROUS SULFATE 325(65) MG
1 TABLET ORAL
Qty: 30 | Refills: 0 | OUTPATIENT
Start: 2017-07-04 | End: 2017-08-03

## 2017-07-04 RX ORDER — DOCUSATE SODIUM 100 MG
1 CAPSULE ORAL
Qty: 60 | Refills: 0 | OUTPATIENT
Start: 2017-07-04 | End: 2017-08-03

## 2017-07-04 RX ADMIN — Medication 600 MILLIGRAM(S): at 02:31

## 2017-07-04 RX ADMIN — Medication 325 MILLIGRAM(S): at 11:47

## 2017-07-04 RX ADMIN — Medication 600 MILLIGRAM(S): at 16:17

## 2017-07-04 RX ADMIN — Medication 650 MILLIGRAM(S): at 12:40

## 2017-07-04 RX ADMIN — Medication 100 MILLIGRAM(S): at 11:47

## 2017-07-04 RX ADMIN — Medication 650 MILLIGRAM(S): at 11:49

## 2017-07-04 RX ADMIN — Medication 600 MILLIGRAM(S): at 09:46

## 2017-07-04 RX ADMIN — Medication 600 MILLIGRAM(S): at 08:46

## 2017-07-04 RX ADMIN — Medication 1 TABLET(S): at 11:47

## 2017-07-04 RX ADMIN — Medication 600 MILLIGRAM(S): at 17:15

## 2017-07-04 RX ADMIN — Medication 600 MILLIGRAM(S): at 03:30

## 2017-07-04 NOTE — DISCHARGE NOTE OB - CARE PLAN
Principal Discharge DX:	 (normal spontaneous vaginal delivery)  Goal:	Reached  Instructions for follow-up, activity and diet:	[facility] [place] [facility]. Principal Discharge DX:	 (normal spontaneous vaginal delivery)  Goal:	Reached  Instructions for follow-up, activity and diet:	Please follow up with OB/GYN at [facility] [place] for post partum check. Principal Discharge DX:	 (normal spontaneous vaginal delivery)  Goal:	Reached  Instructions for follow-up, activity and diet:	Please follow up with OB/GYN at [facility] [place] for post partum check. Principal Discharge DX:	 (normal spontaneous vaginal delivery)  Goal:	Reached  Instructions for follow-up, activity and diet:	Please follow up with OB/GYN at [facility] [place] for post partum check.

## 2017-07-04 NOTE — PROGRESS NOTE ADULT - PROBLEM SELECTOR PLAN 1
Stable  Continue current management  Pain control as needed  Encourage mom and baby ambulation  Plan to d/c tomorrow on PPD #2 to f/u at [facility] clinic for post partum care.

## 2017-07-04 NOTE — DISCHARGE NOTE OB - PATIENT PORTAL LINK FT
“You can access the FollowHealth Patient Portal, offered by [facility], by registering with the following website: [email or web address]”

## 2017-07-04 NOTE — DISCHARGE NOTE OB - MATERIALS PROVIDED
New Beginings/Breastfeeding Guide and Packet/Guide to Postpartum Care/Vaccinations/Breastfeeding Log/[facility]  Screening Program/  Immunization Record/[facility] Hearing Screen Program/Bottle Feeding Log/Breastfeeding Mother’s Support Group Information/Birth Certificate Instructions/Back To Sleep Handout/Shaken Baby Prevention Handout

## 2017-07-04 NOTE — DISCHARGE NOTE OB - PLAN OF CARE
Reached Please follow up with OB/GYN at [facility] for post partum check.

## 2017-07-04 NOTE — PROGRESS NOTE ADULT - SUBJECTIVE AND OBJECTIVE BOX
Pt is 27y year old  s/p  at 39.5wks gestation PPD#1. Patient seen and examined at bedside, no acute overnight events. Patient is ambulating, +eating, +PO hydration, +voiding, +Flatus, +Breast feeding and pain is well controlled. Pt denies having any more chest pain.     ROS- Denies headache, SOB, fever, chills and calf pain.    VS:   Vital Signs Last 24 Hrs  T(C): 36.5 (2017 19:50), Max: 37.2 (2017 15:15)  T(F): 97.7 (2017 19:50), Max: 99 (2017 15:15)  HR: 77 (2017 19:50) (68 - 93)  BP: 115/77 (2017 19:50) (110/71 - 115/77)  RR: 18 (2017 19:50) (16 - 18)    Physical Exam:  General: NAD  CVS: RRR, +S1/S2  Lungs: CTAB, no wheeze, rhonchi or rales.   Breast: No tenderness.   Abdomen: +BS, soft, ND, minimally tender, Fundus firm at level of umbilicus.    Pelvic: Minimal lochia  Ext: No cyanosis, edema or calf tenderness.     Labs:                        10.7   6.8   )-----------( 188      ( 2017 08:13 )             32.5     Urinalysis Basic - ( 2017 08:11 )    Color: Yellow / Appearance: Clear / S.020 / pH: x  Gluc: x / Ketone: Moderate  / Bili: Small / Urobili: Negative mg/dL   Blood: x / Protein: 30 mg/dL / Nitrite: Negative   Leuk Esterase: Small / RBC: 3-5 /HPF / WBC 6-10   Sq Epi: x / Non Sq Epi: Few / Bacteria: x        Medication:  MEDICATIONS  (STANDING):  sodium chloride 0.9% lock flush 3 milliLiter(s) IV Push every 8 hours  diphtheria/tetanus/pertussis (acellular) Vaccine (ADAcel) 0.5 milliLiter(s) IntraMuscular once  oxytocin Infusion 41.667 milliUNIT(s)/Min (125 mL/Hr) IV Continuous <Continuous>  prenatal multivitamin 1 Tablet(s) Oral daily  ferrous    sulfate 325 milliGRAM(s) Oral daily    MEDICATIONS  (PRN):  acetaminophen   Tablet. 650 milliGRAM(s) Oral every 6 hours PRN Mild Pain  acetaminophen   Tablet 650 milliGRAM(s) Oral every 6 hours PRN For Temp greater than 38.5 C (101.3 F)  ibuprofen  Tablet 600 milliGRAM(s) Oral every 6 hours PRN Moderate Pain  oxyCODONE  5 mG/acetaminophen 325 mG 2 Tablet(s) Oral every 6 hours PRN Severe Pain  hydrocortisone 1% Cream 1 Application(s) Topical every 4 hours PRN Moderate to Severe Perineal Pain  dibucaine 1% Ointment 1 Application(s) Topical every 4 hours PRN Perineal Discomfort  lanolin Ointment 1 Application(s) Topical every 6 hours PRN Sore Nipples  witch hazel Pads 1 Application(s) Topical every 4 hours PRN Perineal Discomfort  simethicone 80 milliGRAM(s) Chew every 6 hours PRN Gas  diphenhydrAMINE   Capsule 25 milliGRAM(s) Oral every 6 hours PRN Itching  glycerin Suppository - Adult 1 Suppository(s) Rectal at bedtime PRN Constipation  magnesium hydroxide Suspension 30 milliLiter(s) Oral two times a day PRN Constipation  docusate sodium 100 milliGRAM(s) Oral two times a day PRN Stool Softening

## 2017-07-04 NOTE — DISCHARGE NOTE OB - HOSPITAL COURSE
This is a 28y/o F  who experienced  at 39 and 5. Labor course was uncomplicated, delivery was uncomplicated.  Postpartum course was unremarkable. Patient was transferred to postpartum floor and monitored. Patient is medically optimized for discharge, instructed to follow up with [facility] in 6 weeks for postpartum care.

## 2017-07-04 NOTE — DISCHARGE NOTE OB - MEDICATION SUMMARY - MEDICATIONS TO TAKE
I will START or STAY ON the medications listed below when I get home from the hospital:    ibuprofen 600 mg oral tablet  -- 1 tab(s) by mouth every 6 hours, As needed, Moderate Pain  -- Indication: For pain    ferrous sulfate 325 mg (65 mg elemental iron) oral tablet  -- 1 tab(s) by mouth once a day  -- Check with your doctor before becoming pregnant.  Do not chew, break, or crush.  May discolor urine or feces.    -- Indication: For anemia    docusate sodium 100 mg oral capsule  -- 1 cap(s) by mouth 2 times a day, As needed, Stool Softening  -- Indication: For constipation

## 2017-07-04 NOTE — DISCHARGE NOTE OB - CARE PROVIDER_API CALL
OB/GYN,   [address],   [place] [address]  Ph: [phone]  Phone: (   )    -  Fax: (   )    - Galo Jiménez  [facility]  [address] , [place] [address]  Phone: [phone]  Fax: (       -

## 2017-07-05 VITALS
SYSTOLIC BLOOD PRESSURE: 112 MMHG | RESPIRATION RATE: 18 BRPM | TEMPERATURE: 98 F | DIASTOLIC BLOOD PRESSURE: 74 MMHG | HEART RATE: 76 BPM

## 2017-07-05 RX ADMIN — Medication 600 MILLIGRAM(S): at 09:48

## 2017-07-05 RX ADMIN — Medication 1 TABLET(S): at 09:48

## 2017-07-05 RX ADMIN — Medication 600 MILLIGRAM(S): at 10:45

## 2017-07-05 RX ADMIN — Medication 325 MILLIGRAM(S): at 09:48

## 2017-07-05 NOTE — PROGRESS NOTE ADULT - SUBJECTIVE AND OBJECTIVE BOX
Postpartum progress note.  Patient seen and examined at bedside, no acute overnight events.    Subjective: Patient is ambulating, tolerating PO intake of regular diet, voiding, passing flatus, + bowel movements. No breast tenderness. Patient is breast feeding. Pain is well controlled. She reports minimal vaginal bleeding, has changed pads 1 times during the night, partially soaked. Patient denies headache, nausea/vomiting, shortness of breath, fever, chills or calf pain.     Objective:   Vital Signs: Vital Signs Last 24 Hrs  T(C): 36.8 (2017 20:50), Max: 36.8 (2017 07:59)  T(F): 98.3 (2017 20:50), Max: 98.3 (2017 20:50)  HR: 79 (2017 20:50) (78 - 84)  BP: 111/74 (2017 20:50) (111/74 - 112/78)  BP(mean): --  RR: 19 (2017 20:50) (18 - 19)  SpO2: --    Physical Examination:  General: No acute distress.  Lungs: Clear to auscultation bilaterally, no adventitious sounds.  Cardiovascular: Regular rate and rhythm, normal S1/S2, no murmurs.  Abdomen: Bowel sounds present, soft, non distended, minimally tender, fundus firm at level of umbilicus.  Pelvic: Minimal lochia rubra.  Extremities: No cyanosis or edema. No calf tenderness, (-) Gisselle's sign.    Labs:                        8.9    6.0   )-----------( 187      ( 2017 06:30 )             28.0   Urinalysis Basic - ( 2017 08:11 )    Color: Yellow / Appearance: Clear / S.020 / pH: x  Gluc: x / Ketone: Moderate  / Bili: Small / Urobili: Negative mg/dL   Blood: x / Protein: 30 mg/dL / Nitrite: Negative   Leuk Esterase: Small / RBC: 3-5 /HPF / WBC 6-10   Sq Epi: x / Non Sq Epi: Few / Bacteria: x        Medication:  MEDICATIONS  (STANDING):  sodium chloride 0.9% lock flush 3 milliLiter(s) IV Push every 8 hours  diphtheria/tetanus/pertussis (acellular) Vaccine (ADAcel) 0.5 milliLiter(s) IntraMuscular once  oxytocin Infusion 41.667 milliUNIT(s)/Min (125 mL/Hr) IV Continuous <Continuous>  prenatal multivitamin 1 Tablet(s) Oral daily  ferrous    sulfate 325 milliGRAM(s) Oral daily    MEDICATIONS  (PRN):  acetaminophen   Tablet. 650 milliGRAM(s) Oral every 6 hours PRN Mild Pain  acetaminophen   Tablet 650 milliGRAM(s) Oral every 6 hours PRN For Temp greater than 38.5 C (101.3 F)  ibuprofen  Tablet 600 milliGRAM(s) Oral every 6 hours PRN Moderate Pain  oxyCODONE  5 mG/acetaminophen 325 mG 2 Tablet(s) Oral every 6 hours PRN Severe Pain  hydrocortisone 1% Cream 1 Application(s) Topical every 4 hours PRN Moderate to Severe Perineal Pain  dibucaine 1% Ointment 1 Application(s) Topical every 4 hours PRN Perineal Discomfort  lanolin Ointment 1 Application(s) Topical every 6 hours PRN Sore Nipples  witch hazel Pads 1 Application(s) Topical every 4 hours PRN Perineal Discomfort  simethicone 80 milliGRAM(s) Chew every 6 hours PRN Gas  diphenhydrAMINE   Capsule 25 milliGRAM(s) Oral every 6 hours PRN Itching  glycerin Suppository - Adult 1 Suppository(s) Rectal at bedtime PRN Constipation  magnesium hydroxide Suspension 30 milliLiter(s) Oral two times a day PRN Constipation  docusate sodium 100 milliGRAM(s) Oral two times a day PRN Stool Softening

## 2017-07-05 NOTE — PROGRESS NOTE ADULT - ASSESSMENT
Assessment:  27y years old. [id number] post spontaneous vaginal delivery at 39.5 wks gestation. Postpartum day # 2 without complications    Plan:  Discharge home today.  Pelvic rest and no heavy lifting for 6 weeks.   Follow up in 6 weeks for post partum visit.  Advice to seek medical care in the event of fever, chills, nausea/vomiting, heavy vaginal bleeding, symptoms of depression, or any other concerning symptoms  Continue Motrin (Ibuprofen) 600 mg every 6 hours.

## 2017-07-23 PROCEDURE — 86900 BLOOD TYPING SEROLOGIC ABO: CPT

## 2017-07-23 PROCEDURE — 86850 RBC ANTIBODY SCREEN: CPT

## 2017-07-23 PROCEDURE — 82803 BLOOD GASES ANY COMBINATION: CPT

## 2017-07-23 PROCEDURE — 86592 SYPHILIS TEST NON-TREP QUAL: CPT

## 2017-07-23 PROCEDURE — 87340 HEPATITIS B SURFACE AG IA: CPT

## 2017-07-23 PROCEDURE — 85027 COMPLETE CBC AUTOMATED: CPT

## 2017-07-23 PROCEDURE — 86762 RUBELLA ANTIBODY: CPT

## 2017-07-23 PROCEDURE — G0463: CPT

## 2017-07-23 PROCEDURE — 86901 BLOOD TYPING SEROLOGIC RH(D): CPT

## 2017-07-23 PROCEDURE — 59025 FETAL NON-STRESS TEST: CPT

## 2017-07-23 PROCEDURE — 81001 URINALYSIS AUTO W/SCOPE: CPT

## 2017-07-23 PROCEDURE — 36415 COLL VENOUS BLD VENIPUNCTURE: CPT

## 2017-07-23 PROCEDURE — 93005 ELECTROCARDIOGRAM TRACING: CPT

## 2019-03-05 ENCOUNTER — EMERGENCY (EMERGENCY)
Facility: HOSPITAL | Age: 29
LOS: 1 days | Discharge: LEFT BEFORE DISCHARGE | End: 2019-03-05
Attending: EMERGENCY MEDICINE
Payer: COMMERCIAL

## 2019-03-05 VITALS
OXYGEN SATURATION: 99 % | TEMPERATURE: 97 F | HEART RATE: 86 BPM | WEIGHT: 138.01 LBS | DIASTOLIC BLOOD PRESSURE: 80 MMHG | SYSTOLIC BLOOD PRESSURE: 113 MMHG | HEIGHT: 60 IN | RESPIRATION RATE: 20 BRPM

## 2019-03-05 LAB
APPEARANCE UR: CLEAR — SIGNIFICANT CHANGE UP
BACTERIA # UR AUTO: ABNORMAL
BILIRUB UR-MCNC: NEGATIVE — SIGNIFICANT CHANGE UP
COLOR SPEC: YELLOW — SIGNIFICANT CHANGE UP
COMMENT - URINE: SIGNIFICANT CHANGE UP
DIFF PNL FLD: NEGATIVE — SIGNIFICANT CHANGE UP
EPI CELLS # UR: SIGNIFICANT CHANGE UP
GLUCOSE UR QL: NEGATIVE MG/DL — SIGNIFICANT CHANGE UP
HCG UR QL: NEGATIVE — SIGNIFICANT CHANGE UP
KETONES UR-MCNC: NEGATIVE — SIGNIFICANT CHANGE UP
LEUKOCYTE ESTERASE UR-ACNC: ABNORMAL
NITRITE UR-MCNC: NEGATIVE — SIGNIFICANT CHANGE UP
PH UR: 6 — SIGNIFICANT CHANGE UP (ref 5–8)
PROT UR-MCNC: NEGATIVE MG/DL — SIGNIFICANT CHANGE UP
RBC CASTS # UR COMP ASSIST: SIGNIFICANT CHANGE UP /HPF (ref 0–4)
SP GR SPEC: 1.01 — SIGNIFICANT CHANGE UP (ref 1.01–1.02)
UROBILINOGEN FLD QL: NEGATIVE MG/DL — SIGNIFICANT CHANGE UP
WBC UR QL: SIGNIFICANT CHANGE UP

## 2019-03-05 PROCEDURE — 96372 THER/PROPH/DIAG INJ SC/IM: CPT

## 2019-03-05 PROCEDURE — 76830 TRANSVAGINAL US NON-OB: CPT | Mod: 26

## 2019-03-05 PROCEDURE — 76856 US EXAM PELVIC COMPLETE: CPT

## 2019-03-05 PROCEDURE — 99284 EMERGENCY DEPT VISIT MOD MDM: CPT | Mod: 25

## 2019-03-05 PROCEDURE — 76830 TRANSVAGINAL US NON-OB: CPT

## 2019-03-05 PROCEDURE — 99284 EMERGENCY DEPT VISIT MOD MDM: CPT

## 2019-03-05 PROCEDURE — 76856 US EXAM PELVIC COMPLETE: CPT | Mod: 26

## 2019-03-05 PROCEDURE — 81001 URINALYSIS AUTO W/SCOPE: CPT

## 2019-03-05 PROCEDURE — 81025 URINE PREGNANCY TEST: CPT

## 2019-03-05 PROCEDURE — 87086 URINE CULTURE/COLONY COUNT: CPT

## 2019-03-05 RX ORDER — AZITHROMYCIN 500 MG/1
1000 TABLET, FILM COATED ORAL ONCE
Qty: 0 | Refills: 0 | Status: COMPLETED | OUTPATIENT
Start: 2019-03-05 | End: 2019-03-05

## 2019-03-05 RX ORDER — CEFTRIAXONE 500 MG/1
250 INJECTION, POWDER, FOR SOLUTION INTRAMUSCULAR; INTRAVENOUS ONCE
Qty: 0 | Refills: 0 | Status: COMPLETED | OUTPATIENT
Start: 2019-03-05 | End: 2019-03-05

## 2019-03-05 RX ADMIN — CEFTRIAXONE 250 MILLIGRAM(S): 500 INJECTION, POWDER, FOR SOLUTION INTRAMUSCULAR; INTRAVENOUS at 10:59

## 2019-03-05 RX ADMIN — AZITHROMYCIN 1000 MILLIGRAM(S): 500 TABLET, FILM COATED ORAL at 10:59

## 2019-03-05 NOTE — ED STATDOCS - NS ED ROS FT
Review of Systems  •	CONSTITUTIONAL - no  fever, no diaphoresis, no weight change  •	SKIN - no rash  •	HEMATOLOGIC - no bleeding, no bruising  •	EYES - no eye pain, no blurred vision  •	ENT - no change in hearing, no pain  •	RESPIRATORY - no shortness of breath, no cough  •	CARDIAC - no chest pain, no palpitations  •	GI -  (+) abd pain, no nausea, no vomiting, no diarrhea, no constipation, no bleeding  •	GENITO-URINARY - (+) discharge, no dysuria; no hematuria,   •	ENDO - no polydypsia, no polyurea, no heat/no cold intolerance  •	MUSCULOSKELETAL - no joint pain, no swelling, no redness  •	NEUROLOGIC - no weakness, no headache, no anesthesia, no paresthesias  •	PSYCH - no anxiety, non suicidal, non homicidal, no hallucination, no depression

## 2019-03-05 NOTE — ED STATDOCS - PHYSICAL EXAMINATION
VITAL SIGNS: I have reviewed nursing notes and confirm.  CONSTITUTIONAL: Well-developed; well-nourished; in no acute distress.  SKIN: Skin exam is warm and dry, no acute rash.  HEAD: Normocephalic; atraumatic.  EYES: PERRL, EOM intact; conjunctiva and sclera clear.  ENT: No nasal discharge; airway clear. Throat clear.  NECK: Supple; non tender.  No lymphadenopathy.  CARD: S1, S2 normal; no murmurs, gallops, or rubs. Regular rate and rhythm.  RESP: No wheezes,  no rales or rhonchi.   ABD: Normal bowel sounds; soft; non-distended; (+) b/l lower abdomina pain. no hepatosplenomegaly.  EXT: Normal ROM. No clubbing, cyanosis or edema.  NEURO: Alert, oriented. Grossly unremarkable. No focal deficits. no facial droop, moves all extremities,   PSYCH: Cooperative, appropriate.

## 2019-03-05 NOTE — ED STATDOCS - CLINICAL SUMMARY MEDICAL DECISION MAKING FREE TEXT BOX
29 yo F hx of chlamydia  untreated, now with worsening pain. Will check ua, pregnancy, STD test, Will give ceftriaxone 250 mg Im and azithromycin 1 g

## 2019-03-05 NOTE — ED ADULT NURSE REASSESSMENT NOTE - NS ED NURSE REASSESS COMMENT FT1
Pt brought back from u/s due to UCG pregnancy not resulted. Ultra sound called and message left at this time to inform that it is negative.

## 2019-03-05 NOTE — ED STATDOCS - ATTENDING CONTRIBUTION TO CARE
I, Jb Lao, performed the initial face to face bedside interview with this patient regarding history of present illness, review of symptoms and relevant past medical, social and family history.  I completed an independent physical examination.  I was the initial provider who evaluated this patient. I have signed out the follow up of any pending tests (i.e. labs, radiological studies) to the ACP.  I have communicated the patient’s plan of care and disposition with the ACP.

## 2019-03-05 NOTE — ED STATDOCS - OBJECTIVE STATEMENT
29 yo F hx of ovarian cyst p.w diffuse lower abdominal pain x 1 week. She report positive test for chlamydia at the [facility] clinic about 8 days ago. However, she didn't receive any medication. She had report prior yellow discharge which now stopped. She now report worsening of pain. She called the clinic and send her to the ED. No fever, no nausea, no vomiting, no dysuria. Allergy to PCN, doesn't remember the reaction

## 2019-03-05 NOTE — ED STATDOCS - PROGRESS NOTE DETAILS
27 yo female with pmhx of ovarian cyst presents to the ED c/o of "lower abdominal pain" that radiates to back x 4 days. pt describes pain as 6/10 "burning pain". pt admits she was seen at clinic and diagnosed with chlamydia thursday 2/28. Pt admtis she was given 1 dose azithromycin at clinic. Pt also admits to bleeding and vaginal d/c 3 days ago which has now subsided. Pt admits to vaginal intercourse with 1 partner in the last 6 months. Pt does not use barrier protection. LMP 2/14. Pt denies dysuria, N/V/D, travel, changes in appetite. PE; abd- bowel sounds normoactive x4, suprapubic tenderness. back- no midline tenderness, tenderness on L lumbar region   Assessment 1) Chlamydia, r/o PID --plan- pelvic US, bimanual pelvic exam for eval of cmt patient states that she can not stay that she needs to  kids, does not want to wait for results of US or for lab work. verbalize to patient that would be leaving against medical advice and the possibility of and not limited to infection, abscess, sepsis or death or severe disability   verbalizes understanding patient now states that she doesn't know if she wants to leave... patient left AMA

## 2019-03-05 NOTE — ED ADULT NURSE NOTE - OBJECTIVE STATEMENT
Pt c/o lower abdominal pain that started 3 days ago. pt states she also had burning with urination,  Pt denies fever/chills. denies n/v/. Pt A & OX4, breathing even & unlabored.

## 2019-03-06 LAB
CULTURE RESULTS: NO GROWTH — SIGNIFICANT CHANGE UP
SPECIMEN SOURCE: SIGNIFICANT CHANGE UP

## 2021-09-01 NOTE — PATIENT PROFILE OB - WEIGHT : PRESENT IN LBS
Class III - visualization of the soft palate and the base of the uvula 142 Class I (easy) - visualization of the soft palate, fauces, uvula, and both anterior and posterior pillars

## 2022-08-04 NOTE — ED STATDOCS - PROGRESS NOTE ADDITIONAL1
Additional Progress Note... Metronidazole Pregnancy And Lactation Text: This medication is Pregnancy Category B and considered safe during pregnancy.  It is also excreted in breast milk.

## 2025-04-16 NOTE — PATIENT PROFILE OB - POST PARTUM DEPRESSION SCREEN OB 5
[General Appearance - Alert] : alert [General Appearance - In No Acute Distress] : in no acute distress [General Appearance - Well Nourished] : well nourished [Sclera] : the sclera and conjunctiva were normal [PERRL With Normal Accommodation] : pupils were equal in size, round, and reactive to light [Extraocular Movements] : extraocular movements were intact [Outer Ear] : the ears and nose were normal in appearance [Neck Appearance] : the appearance of the neck was normal [Respiration, Rhythm And Depth] : normal respiratory rhythm and effort [Edema] : there was no peripheral edema [No CVA Tenderness] : no ~M costovertebral angle tenderness [No Spinal Tenderness] : no spinal tenderness [Nail Clubbing] : no clubbing  or cyanosis of the fingernails [Musculoskeletal - Swelling] : no joint swelling seen [Motor Tone] : muscle strength and tone were normal [] : no rash [Motor Exam] : the motor exam was normal [No Focal Deficits] : no focal deficits [Oriented To Time, Place, And Person] : oriented to person, place, and time [FreeTextEntry1] : Strength intact but gait slightly off balance and tentative - stable but persistent  no